# Patient Record
Sex: FEMALE | URBAN - METROPOLITAN AREA
[De-identification: names, ages, dates, MRNs, and addresses within clinical notes are randomized per-mention and may not be internally consistent; named-entity substitution may affect disease eponyms.]

---

## 2022-09-20 ENCOUNTER — APPOINTMENT (OUTPATIENT)
Dept: URBAN - METROPOLITAN AREA CLINIC 256 | Age: 32
Setting detail: DERMATOLOGY
End: 2022-09-22

## 2022-09-20 ENCOUNTER — APPOINTMENT (OUTPATIENT)
Dept: URBAN - METROPOLITAN AREA CLINIC 256 | Age: 32
Setting detail: DERMATOLOGY
End: 2022-09-20

## 2022-09-20 VITALS — WEIGHT: 235 LBS | HEIGHT: 67 IN

## 2022-09-20 DIAGNOSIS — Z41.9 ENCOUNTER FOR PROCEDURE FOR PURPOSES OTHER THAN REMEDYING HEALTH STATE, UNSPECIFIED: ICD-10-CM

## 2022-09-20 DIAGNOSIS — L20.89 OTHER ATOPIC DERMATITIS: ICD-10-CM

## 2022-09-20 PROBLEM — L20.84 INTRINSIC (ALLERGIC) ECZEMA: Status: ACTIVE | Noted: 2022-09-20

## 2022-09-20 PROCEDURE — OTHER DYSPORT (U OR CC): OTHER

## 2022-09-20 PROCEDURE — OTHER MIPS QUALITY: OTHER

## 2022-09-20 PROCEDURE — OTHER PRESCRIPTION: OTHER

## 2022-09-20 PROCEDURE — OTHER COUNSELING: OTHER

## 2022-09-20 PROCEDURE — 99203 OFFICE O/P NEW LOW 30 MIN: CPT

## 2022-09-20 RX ORDER — CLOBETASOL PROPIONATE 0.5 MG/G
0.05% OINTMENT TOPICAL BID
Qty: 45 | Refills: 2 | Status: ERX | COMMUNITY
Start: 2022-09-20

## 2022-09-20 ASSESSMENT — LOCATION DETAILED DESCRIPTION DERM: LOCATION DETAILED: RIGHT ULNAR PALM

## 2022-09-20 ASSESSMENT — LOCATION SIMPLE DESCRIPTION DERM: LOCATION SIMPLE: RIGHT HAND

## 2022-09-20 ASSESSMENT — LOCATION ZONE DERM: LOCATION ZONE: HAND

## 2022-09-20 NOTE — HPI: DRY SKIN
How Severe Is Your Dry Skin?: mild
Is This A New Presentation Or A Follow-Up?: Dry Skin
Additional History: Patient notes that she does work out frequently and believes that some of the dryness is due to the barbells. However, she notes that some of the areas already existed prior to working out more frequently.